# Patient Record
Sex: FEMALE | Race: WHITE | NOT HISPANIC OR LATINO | Employment: FULL TIME | URBAN - METROPOLITAN AREA
[De-identification: names, ages, dates, MRNs, and addresses within clinical notes are randomized per-mention and may not be internally consistent; named-entity substitution may affect disease eponyms.]

---

## 2017-07-19 ENCOUNTER — AMBULATORY - HEALTHEAST (OUTPATIENT)
Dept: ADDICTION MEDICINE | Facility: HOSPITAL | Age: 32
End: 2017-07-19

## 2017-07-19 ENCOUNTER — OFFICE VISIT - HEALTHEAST (OUTPATIENT)
Dept: ADDICTION MEDICINE | Facility: HOSPITAL | Age: 32
End: 2017-07-19

## 2017-07-19 DIAGNOSIS — F15.20 METHAMPHETAMINE USE DISORDER, SEVERE, DEPENDENCE (H): ICD-10-CM

## 2017-07-24 ENCOUNTER — OFFICE VISIT - HEALTHEAST (OUTPATIENT)
Dept: ADDICTION MEDICINE | Facility: HOSPITAL | Age: 32
End: 2017-07-24

## 2017-07-24 DIAGNOSIS — F15.20 METHAMPHETAMINE USE DISORDER, SEVERE, DEPENDENCE (H): ICD-10-CM

## 2017-07-25 ENCOUNTER — OFFICE VISIT - HEALTHEAST (OUTPATIENT)
Dept: ADDICTION MEDICINE | Facility: HOSPITAL | Age: 32
End: 2017-07-25

## 2017-07-25 DIAGNOSIS — F15.20 METHAMPHETAMINE USE DISORDER, SEVERE, DEPENDENCE (H): ICD-10-CM

## 2017-07-26 ENCOUNTER — OFFICE VISIT - HEALTHEAST (OUTPATIENT)
Dept: ADDICTION MEDICINE | Facility: HOSPITAL | Age: 32
End: 2017-07-26

## 2017-07-26 DIAGNOSIS — F15.20 METHAMPHETAMINE USE DISORDER, SEVERE, DEPENDENCE (H): ICD-10-CM

## 2017-07-27 ENCOUNTER — AMBULATORY - HEALTHEAST (OUTPATIENT)
Dept: ADDICTION MEDICINE | Facility: HOSPITAL | Age: 32
End: 2017-07-27

## 2017-07-28 ENCOUNTER — OFFICE VISIT - HEALTHEAST (OUTPATIENT)
Dept: ADDICTION MEDICINE | Facility: HOSPITAL | Age: 32
End: 2017-07-28

## 2017-07-28 DIAGNOSIS — F15.20 METHAMPHETAMINE USE DISORDER, SEVERE, DEPENDENCE (H): ICD-10-CM

## 2017-07-31 ENCOUNTER — OFFICE VISIT - HEALTHEAST (OUTPATIENT)
Dept: ADDICTION MEDICINE | Facility: HOSPITAL | Age: 32
End: 2017-07-31

## 2017-07-31 DIAGNOSIS — F15.20 METHAMPHETAMINE USE DISORDER, SEVERE, DEPENDENCE (H): ICD-10-CM

## 2017-08-01 ENCOUNTER — OFFICE VISIT - HEALTHEAST (OUTPATIENT)
Dept: ADDICTION MEDICINE | Facility: HOSPITAL | Age: 32
End: 2017-08-01

## 2017-08-01 DIAGNOSIS — F15.20 METHAMPHETAMINE USE DISORDER, SEVERE, DEPENDENCE (H): ICD-10-CM

## 2017-08-02 ENCOUNTER — OFFICE VISIT - HEALTHEAST (OUTPATIENT)
Dept: ADDICTION MEDICINE | Facility: HOSPITAL | Age: 32
End: 2017-08-02

## 2017-08-02 ENCOUNTER — AMBULATORY - HEALTHEAST (OUTPATIENT)
Dept: ADDICTION MEDICINE | Facility: HOSPITAL | Age: 32
End: 2017-08-02

## 2017-08-02 DIAGNOSIS — F15.20 METHAMPHETAMINE USE DISORDER, SEVERE, DEPENDENCE (H): ICD-10-CM

## 2017-08-04 ENCOUNTER — OFFICE VISIT - HEALTHEAST (OUTPATIENT)
Dept: ADDICTION MEDICINE | Facility: HOSPITAL | Age: 32
End: 2017-08-04

## 2017-08-04 DIAGNOSIS — F15.20 METHAMPHETAMINE USE DISORDER, SEVERE, DEPENDENCE (H): ICD-10-CM

## 2017-08-07 ENCOUNTER — OFFICE VISIT - HEALTHEAST (OUTPATIENT)
Dept: ADDICTION MEDICINE | Facility: HOSPITAL | Age: 32
End: 2017-08-07

## 2017-08-07 DIAGNOSIS — F15.20 METHAMPHETAMINE USE DISORDER, SEVERE, DEPENDENCE (H): ICD-10-CM

## 2017-08-08 ENCOUNTER — COMMUNICATION - HEALTHEAST (OUTPATIENT)
Dept: ADDICTION MEDICINE | Facility: HOSPITAL | Age: 32
End: 2017-08-08

## 2017-08-09 ENCOUNTER — OFFICE VISIT - HEALTHEAST (OUTPATIENT)
Dept: ADDICTION MEDICINE | Facility: HOSPITAL | Age: 32
End: 2017-08-09

## 2017-08-09 DIAGNOSIS — F15.20 METHAMPHETAMINE USE DISORDER, SEVERE, DEPENDENCE (H): ICD-10-CM

## 2017-08-10 ENCOUNTER — AMBULATORY - HEALTHEAST (OUTPATIENT)
Dept: ADDICTION MEDICINE | Facility: HOSPITAL | Age: 32
End: 2017-08-10

## 2017-08-11 ENCOUNTER — OFFICE VISIT - HEALTHEAST (OUTPATIENT)
Dept: ADDICTION MEDICINE | Facility: HOSPITAL | Age: 32
End: 2017-08-11

## 2017-08-11 DIAGNOSIS — F15.20 METHAMPHETAMINE USE DISORDER, SEVERE, DEPENDENCE (H): ICD-10-CM

## 2017-08-14 ENCOUNTER — OFFICE VISIT - HEALTHEAST (OUTPATIENT)
Dept: ADDICTION MEDICINE | Facility: HOSPITAL | Age: 32
End: 2017-08-14

## 2017-08-14 DIAGNOSIS — F15.20 METHAMPHETAMINE USE DISORDER, SEVERE, DEPENDENCE (H): ICD-10-CM

## 2017-08-15 ENCOUNTER — OFFICE VISIT - HEALTHEAST (OUTPATIENT)
Dept: ADDICTION MEDICINE | Facility: HOSPITAL | Age: 32
End: 2017-08-15

## 2017-08-15 DIAGNOSIS — F15.20 METHAMPHETAMINE USE DISORDER, SEVERE, DEPENDENCE (H): ICD-10-CM

## 2017-08-16 ENCOUNTER — OFFICE VISIT - HEALTHEAST (OUTPATIENT)
Dept: ADDICTION MEDICINE | Facility: HOSPITAL | Age: 32
End: 2017-08-16

## 2017-08-16 DIAGNOSIS — F15.20 METHAMPHETAMINE USE DISORDER, SEVERE, DEPENDENCE (H): ICD-10-CM

## 2017-08-17 ENCOUNTER — AMBULATORY - HEALTHEAST (OUTPATIENT)
Dept: ADDICTION MEDICINE | Facility: HOSPITAL | Age: 32
End: 2017-08-17

## 2017-08-21 ENCOUNTER — OFFICE VISIT - HEALTHEAST (OUTPATIENT)
Dept: ADDICTION MEDICINE | Facility: HOSPITAL | Age: 32
End: 2017-08-21

## 2017-08-21 DIAGNOSIS — F15.20 METHAMPHETAMINE USE DISORDER, SEVERE, DEPENDENCE (H): ICD-10-CM

## 2017-08-22 ENCOUNTER — OFFICE VISIT - HEALTHEAST (OUTPATIENT)
Dept: ADDICTION MEDICINE | Facility: HOSPITAL | Age: 32
End: 2017-08-22

## 2017-08-22 DIAGNOSIS — F15.20 METHAMPHETAMINE USE DISORDER, SEVERE, DEPENDENCE (H): ICD-10-CM

## 2017-08-23 ENCOUNTER — OFFICE VISIT - HEALTHEAST (OUTPATIENT)
Dept: ADDICTION MEDICINE | Facility: HOSPITAL | Age: 32
End: 2017-08-23

## 2017-08-23 DIAGNOSIS — F15.20 METHAMPHETAMINE USE DISORDER, SEVERE, DEPENDENCE (H): ICD-10-CM

## 2017-08-24 ENCOUNTER — AMBULATORY - HEALTHEAST (OUTPATIENT)
Dept: ADDICTION MEDICINE | Facility: HOSPITAL | Age: 32
End: 2017-08-24

## 2017-08-28 ENCOUNTER — OFFICE VISIT - HEALTHEAST (OUTPATIENT)
Dept: ADDICTION MEDICINE | Facility: HOSPITAL | Age: 32
End: 2017-08-28

## 2017-08-28 DIAGNOSIS — F15.20 METHAMPHETAMINE USE DISORDER, SEVERE, DEPENDENCE (H): ICD-10-CM

## 2017-08-29 ENCOUNTER — OFFICE VISIT - HEALTHEAST (OUTPATIENT)
Dept: ADDICTION MEDICINE | Facility: HOSPITAL | Age: 32
End: 2017-08-29

## 2017-08-29 ENCOUNTER — COMMUNICATION - HEALTHEAST (OUTPATIENT)
Dept: ADDICTION MEDICINE | Facility: HOSPITAL | Age: 32
End: 2017-08-29

## 2017-08-29 DIAGNOSIS — F15.20 METHAMPHETAMINE USE DISORDER, SEVERE, DEPENDENCE (H): ICD-10-CM

## 2017-08-30 ENCOUNTER — OFFICE VISIT - HEALTHEAST (OUTPATIENT)
Dept: ADDICTION MEDICINE | Facility: HOSPITAL | Age: 32
End: 2017-08-30

## 2017-08-30 DIAGNOSIS — F15.20 METHAMPHETAMINE USE DISORDER, SEVERE, DEPENDENCE (H): ICD-10-CM

## 2017-08-31 ENCOUNTER — AMBULATORY - HEALTHEAST (OUTPATIENT)
Dept: ADDICTION MEDICINE | Facility: HOSPITAL | Age: 32
End: 2017-08-31

## 2017-09-05 ENCOUNTER — COMMUNICATION - HEALTHEAST (OUTPATIENT)
Dept: ADDICTION MEDICINE | Facility: HOSPITAL | Age: 32
End: 2017-09-05

## 2017-09-06 ENCOUNTER — OFFICE VISIT - HEALTHEAST (OUTPATIENT)
Dept: ADDICTION MEDICINE | Facility: HOSPITAL | Age: 32
End: 2017-09-06

## 2017-09-06 DIAGNOSIS — F15.20 METHAMPHETAMINE USE DISORDER, SEVERE, DEPENDENCE (H): ICD-10-CM

## 2017-09-07 ENCOUNTER — AMBULATORY - HEALTHEAST (OUTPATIENT)
Dept: ADDICTION MEDICINE | Facility: HOSPITAL | Age: 32
End: 2017-09-07

## 2017-09-08 ENCOUNTER — OFFICE VISIT - HEALTHEAST (OUTPATIENT)
Dept: ADDICTION MEDICINE | Facility: HOSPITAL | Age: 32
End: 2017-09-08

## 2017-09-08 DIAGNOSIS — F15.20 METHAMPHETAMINE USE DISORDER, SEVERE, DEPENDENCE (H): ICD-10-CM

## 2017-09-11 ENCOUNTER — OFFICE VISIT - HEALTHEAST (OUTPATIENT)
Dept: ADDICTION MEDICINE | Facility: HOSPITAL | Age: 32
End: 2017-09-11

## 2017-09-11 DIAGNOSIS — F15.20 METHAMPHETAMINE USE DISORDER, SEVERE, DEPENDENCE (H): ICD-10-CM

## 2017-09-12 ENCOUNTER — COMMUNICATION - HEALTHEAST (OUTPATIENT)
Dept: ADDICTION MEDICINE | Facility: HOSPITAL | Age: 32
End: 2017-09-12

## 2017-09-13 ENCOUNTER — OFFICE VISIT - HEALTHEAST (OUTPATIENT)
Dept: ADDICTION MEDICINE | Facility: HOSPITAL | Age: 32
End: 2017-09-13

## 2017-09-13 DIAGNOSIS — F15.20 METHAMPHETAMINE USE DISORDER, SEVERE, DEPENDENCE (H): ICD-10-CM

## 2017-09-15 ENCOUNTER — OFFICE VISIT - HEALTHEAST (OUTPATIENT)
Dept: ADDICTION MEDICINE | Facility: HOSPITAL | Age: 32
End: 2017-09-15

## 2017-09-15 ENCOUNTER — AMBULATORY - HEALTHEAST (OUTPATIENT)
Dept: ADDICTION MEDICINE | Facility: HOSPITAL | Age: 32
End: 2017-09-15

## 2017-09-15 DIAGNOSIS — F15.20 METHAMPHETAMINE USE DISORDER, SEVERE, DEPENDENCE (H): ICD-10-CM

## 2017-09-19 ENCOUNTER — OFFICE VISIT - HEALTHEAST (OUTPATIENT)
Dept: ADDICTION MEDICINE | Facility: HOSPITAL | Age: 32
End: 2017-09-19

## 2017-09-19 DIAGNOSIS — F15.20 METHAMPHETAMINE USE DISORDER, SEVERE, DEPENDENCE (H): ICD-10-CM

## 2017-09-20 ENCOUNTER — OFFICE VISIT - HEALTHEAST (OUTPATIENT)
Dept: ADDICTION MEDICINE | Facility: HOSPITAL | Age: 32
End: 2017-09-20

## 2017-09-20 DIAGNOSIS — F15.20 METHAMPHETAMINE USE DISORDER, SEVERE, DEPENDENCE (H): ICD-10-CM

## 2017-09-21 ENCOUNTER — AMBULATORY - HEALTHEAST (OUTPATIENT)
Dept: ADDICTION MEDICINE | Facility: HOSPITAL | Age: 32
End: 2017-09-21

## 2017-09-26 ENCOUNTER — OFFICE VISIT - HEALTHEAST (OUTPATIENT)
Dept: ADDICTION MEDICINE | Facility: HOSPITAL | Age: 32
End: 2017-09-26

## 2017-09-26 DIAGNOSIS — F15.20 METHAMPHETAMINE USE DISORDER, SEVERE, DEPENDENCE (H): ICD-10-CM

## 2017-09-27 ENCOUNTER — OFFICE VISIT - HEALTHEAST (OUTPATIENT)
Dept: ADDICTION MEDICINE | Facility: HOSPITAL | Age: 32
End: 2017-09-27

## 2017-09-27 DIAGNOSIS — F15.20 METHAMPHETAMINE USE DISORDER, SEVERE, DEPENDENCE (H): ICD-10-CM

## 2017-09-28 ENCOUNTER — AMBULATORY - HEALTHEAST (OUTPATIENT)
Dept: ADDICTION MEDICINE | Facility: HOSPITAL | Age: 32
End: 2017-09-28

## 2017-10-03 ENCOUNTER — OFFICE VISIT - HEALTHEAST (OUTPATIENT)
Dept: ADDICTION MEDICINE | Facility: HOSPITAL | Age: 32
End: 2017-10-03

## 2017-10-03 DIAGNOSIS — F15.20 METHAMPHETAMINE USE DISORDER, SEVERE, DEPENDENCE (H): ICD-10-CM

## 2017-10-04 ENCOUNTER — OFFICE VISIT - HEALTHEAST (OUTPATIENT)
Dept: ADDICTION MEDICINE | Facility: HOSPITAL | Age: 32
End: 2017-10-04

## 2017-10-04 DIAGNOSIS — F15.20 METHAMPHETAMINE USE DISORDER, SEVERE, DEPENDENCE (H): ICD-10-CM

## 2017-10-05 ENCOUNTER — AMBULATORY - HEALTHEAST (OUTPATIENT)
Dept: ADDICTION MEDICINE | Facility: HOSPITAL | Age: 32
End: 2017-10-05

## 2017-10-10 ENCOUNTER — OFFICE VISIT - HEALTHEAST (OUTPATIENT)
Dept: ADDICTION MEDICINE | Facility: HOSPITAL | Age: 32
End: 2017-10-10

## 2017-10-10 DIAGNOSIS — F15.20 METHAMPHETAMINE USE DISORDER, SEVERE, DEPENDENCE (H): ICD-10-CM

## 2017-10-11 ENCOUNTER — OFFICE VISIT - HEALTHEAST (OUTPATIENT)
Dept: ADDICTION MEDICINE | Facility: HOSPITAL | Age: 32
End: 2017-10-11

## 2017-10-11 DIAGNOSIS — F15.20 METHAMPHETAMINE USE DISORDER, SEVERE, DEPENDENCE (H): ICD-10-CM

## 2017-10-19 ENCOUNTER — AMBULATORY - HEALTHEAST (OUTPATIENT)
Dept: ADDICTION MEDICINE | Facility: HOSPITAL | Age: 32
End: 2017-10-19
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
## 2021-06-11 NOTE — PROGRESS NOTES
Addiction Services - Initial Services Plan      Name:  Ananda Lindo  :  1985       MRN:  [id number]     Goal Methods   1.  Acceptance of chemical dependency and mental illness as a disease. A.  Comply with all med/psych recommendations  B.  Complete preliminary interviews  C.  Attend all program functions  D.  Attend all individual counseling sessions  E.  Read all assigned literature  F.  Complete psychological testing as recommended  G.  Particpate in any necessary consultations     2.  Acceptance of my need and ability to change A.  Complete any assignments.  B.  Participate in conferences (P.O., , family)  C.  Participate in 12 Step/support groups  D.  Actively participate in treatment planning  E.  Complete all assignments given or recommended on Treatment Plan     3.  Acceptance of staff recommendations as a means to my recovery A.  Participate in all interviews for Continuum of Care Plans  B.  Familiarize self with recovery program and how this applies to your day-to-day behavior       Patient describes their immediate need:  Pt reports none  Are there any immediate Safety Needs such as (physical, stability, mobility):  Pt reports none.    Immediate Health Needs and Plan:  Pt reports none.   Vulnerable Adult:  No    [] Continue Current Medications for:   [] Request Consult for:  [] Notify Attending Physician about:  [] Other:      Issues to be addressed in the first sessions:    Become acquainted with group norms and other group members.    Set up time to meet with primary counselor 1:1.     Patient strengths and needs:  Strengths: willingness to learn, gets along with people    Needs: additional sober relationships and structure, relapse prevention coping skills.     Plan for patient for time between intake and completion of the treatment plan:  Remain abstinent from any illicit substance/alcohol use and start group on Monday, 17  Participate in all treatment activities and  assignments.     Staff Members' Titles authorized to Initiate Services are:    Director of Behavioral Service    Clinical Director of Chemical Dependency    Primary Counselor    MI/MEREDITH Sr. Counselor        Nursing Staff    Vulnerable Adult Review  [x] Review of the facility Abuse Prevention plan was reviewed with the patient  [x] No individual abuse plan is necessary  [] In addition to the facility Abuse Prevention plan, an Individual Abuse Plan will be put in place    I understand these goals to be the Treatment Goals of the Program, and I agree to the stated Methods in attempting to accomplish these goals.    Patient Signature:  _________________________Date:  ___________________    Staff Name/Title:  FEDERICO Hylton, [facility]  Date:  7/19/2017  Time: 3:26 PM
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
## 2021-06-12 NOTE — PROGRESS NOTES
Ananda Lindo attended 3 hours of group therapy today.    Pt attended first IOP group.     7/24/2017 1:17 PM Raciel Lau
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
## 2021-06-12 NOTE — PROGRESS NOTES
Ananda Lindo attended 3 hours of group therapy today.    Pt reports continuing abstinence from all mood-altering substances.     7/31/2017 3:11 PM Raciel Lau

## 2021-06-12 NOTE — PROGRESS NOTES
Ananda Lindo attended 3 hours of group therapy today.    Pt reports continuing abstinence from all mood-altering substances.     9/11/2017 1:13 PM Raciel Lau

## 2021-06-12 NOTE — PROGRESS NOTES
Ananda Lindo attended 3 hours of group therapy today.    Pt briefly met w counselor individually to report she is feeling overwhelmed with her various obligations, including IOP x 4/wk, periodic UA's in a different part of [facility], parameters per her CP case and job hunting. Pt indicated she was open to following through on previous referral to individual counseling though feared adding another obligation to her schedule. Pt may be permitted to step-down to IOP x3/wk given her recent completion of Residential CD Tx and Aftercare. Pt agreed to schedule a MH Intake and f/u w counselor next week.     8/9/2017 2:01 PM Raciel Lau

## 2021-06-12 NOTE — PROGRESS NOTES
Ananda Lindo attended 3 hours of group therapy today.    Pt reports continuing abstinence from all mood-altering substances.     8/21/2017 1:34 PM Raciel Lau
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
## 2021-06-12 NOTE — PROGRESS NOTES
Ananda Lindo attended 3 hours of group therapy today.    Pt was provided information for local [facility] 180 meetings.     7/26/2017 1:43 PM Raciel Lau

## 2021-06-12 NOTE — PROGRESS NOTES
Ananda Lindo attended 3 hours of group therapy today.    Pt reports continuing abstinence from mood-altering substances.     8/7/2017 12:50 PM Raciel Lau

## 2021-06-12 NOTE — PROGRESS NOTES
Ananda Lindo attended 3 hours of group therapy today.    Pt reports continuing abstinence from all mood-altering substances.     9/6/2017 12:50 PM Raciel Lau

## 2021-06-12 NOTE — PROGRESS NOTES
Ananda Lindo attended 3 hours of group therapy today.    Pt reports she learned her Child Protection worker will recommend decreasing her current level-of-supervision at a scheduled court date on 8/31. Pt will move to monthly schedule UA tests and will be permitted unsupervised visits with her infant. Pt expressed considerable relief and gratitude.     8/30/2017 1:56 PM Raciel Lau

## 2021-06-12 NOTE — PROGRESS NOTES
Ananda Lindo attended 3 hours of group therapy today.    Pt briefly met w counselor individually. Pt reports she is frustrated with treatment as she has been in treatment for approximately 9 months. Pt states she wants to work and wishes she had more money coming in. Pt indicated frustration around wishing she could spend more time with her son. Pt also indicated she lives in a chaotic environment with lots of people coming and going, including several children present. Pt indicated this is a safe environment for her but that it leads to a sense of feeling overwhelmed.     Pt will step-down to IOP 2x/wk beginning next week, attending Tuesdays and Wednesdays. Pt has indv therapy appt scheduled for Mon 9/11 through [facility] in [place], [place].     9/8/2017 1:04 PM Raciel Lau
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
## 2021-06-13 NOTE — PROGRESS NOTES
"Ananda Lindo attended 3 hours of group therapy today.    Pt met briefly with counselor and agreed to step-down to IOP twice/wk, Tuesdays and Wednesdays, beginning 9/26. Pt will focus on overlapping the remainder of IOP with finding a \"home group,\" AA, NA, and/or [facility] with which to establish recovery relationships.     9/19/2017 2:23 PM Raciel Lau  "
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
## 2021-06-13 NOTE — PROGRESS NOTES
Ananda Lindo attended 3 hours of group therapy today.    Pt reports continuing abstinence from mood-altering substances. Pt reports she has been informed by her CPS worker that her Child Protection case will be closed on 10/26/17, to which Pt expressed great relief. Pt has been meeting with a therapist through [facility] and has requested a referral to a different therapist, preferably male, with a Pentecostal background.     10/3/2017 12:28 PM Raciel Lau
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
## 2021-06-13 NOTE — PROGRESS NOTES
Ananda Lindo attended 3 hours of group therapy today.    Pt completed IOP with staff approval on this date.     10/11/2017 1:40 PM Raciel Lau
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
## 2021-06-13 NOTE — PROGRESS NOTES
Ananda Lindo attended 3 hours of group therapy today.    Pt reports continuing abstinence from mood-altering substances. Pt reports attending NA and AA on 9/24 and 9/25 respectively.     9/26/2017 12:45 PM Raciel Lau
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
## 2022-09-19 NOTE — PROGRESS NOTES
Ananda Lindo attended 3 hours of group therapy today.    Pt reports continuing abstinence from methamphetamine. Pt attended  diagnostic assessment on 8/25 at [facility] in [place], [place].     8/28/2017 1:15 PM Raciel Lau   Home

## 2023-09-10 ENCOUNTER — WALK IN (OUTPATIENT)
Dept: URGENT CARE | Age: 38
End: 2023-09-10

## 2023-09-10 VITALS
TEMPERATURE: 97.7 F | WEIGHT: 104.28 LBS | HEART RATE: 110 BPM | SYSTOLIC BLOOD PRESSURE: 122 MMHG | OXYGEN SATURATION: 96 % | DIASTOLIC BLOOD PRESSURE: 64 MMHG

## 2023-09-10 DIAGNOSIS — J45.909 ACUTE ASTHMA: Primary | ICD-10-CM

## 2023-09-10 PROCEDURE — 99214 OFFICE O/P EST MOD 30 MIN: CPT | Performed by: STUDENT IN AN ORGANIZED HEALTH CARE EDUCATION/TRAINING PROGRAM

## 2023-09-10 RX ORDER — TRAZODONE HYDROCHLORIDE 50 MG/1
50 TABLET ORAL NIGHTLY PRN
COMMUNITY
Start: 2023-07-11

## 2023-09-10 RX ORDER — ALBUTEROL SULFATE 90 UG/1
2 AEROSOL, METERED RESPIRATORY (INHALATION) EVERY 4 HOURS PRN
Qty: 1 EACH | Refills: 0 | Status: SHIPPED | OUTPATIENT
Start: 2023-09-10

## 2023-09-10 RX ORDER — NALOXONE HYDROCHLORIDE 4 MG/.1ML
SPRAY NASAL
COMMUNITY
Start: 2023-07-11

## 2023-09-10 RX ORDER — BUPRENORPHINE HYDROCHLORIDE, NALOXONE HYDROCHLORIDE 4; 1 MG/1; MG/1
FILM, SOLUBLE BUCCAL; SUBLINGUAL
COMMUNITY
Start: 2023-07-14

## 2023-09-10 RX ORDER — PREDNISONE 20 MG/1
TABLET ORAL
Qty: 10 TABLET | Refills: 0 | Status: SHIPPED | OUTPATIENT
Start: 2023-09-10

## 2023-09-10 RX ORDER — AZITHROMYCIN 250 MG/1
TABLET, FILM COATED ORAL
Qty: 6 TABLET | Refills: 0 | Status: SHIPPED | OUTPATIENT
Start: 2023-09-10 | End: 2023-09-15

## 2023-11-08 DIAGNOSIS — J45.909 ACUTE ASTHMA: ICD-10-CM

## 2023-11-09 RX ORDER — ALBUTEROL SULFATE 90 UG/1
AEROSOL, METERED RESPIRATORY (INHALATION)
Qty: 8.5 G | OUTPATIENT
Start: 2023-11-09

## 2024-01-25 ENCOUNTER — WALK IN (OUTPATIENT)
Dept: URGENT CARE | Age: 39
End: 2024-01-25

## 2024-01-25 VITALS
HEIGHT: 65 IN | RESPIRATION RATE: 16 BRPM | WEIGHT: 110 LBS | TEMPERATURE: 101.9 F | BODY MASS INDEX: 18.33 KG/M2 | DIASTOLIC BLOOD PRESSURE: 65 MMHG | OXYGEN SATURATION: 98 % | SYSTOLIC BLOOD PRESSURE: 93 MMHG | HEART RATE: 127 BPM

## 2024-01-25 DIAGNOSIS — J02.9 SORE THROAT: ICD-10-CM

## 2024-01-25 DIAGNOSIS — Z20.822 SUSPECTED COVID-19 VIRUS INFECTION: ICD-10-CM

## 2024-01-25 DIAGNOSIS — J02.0 STREP THROAT: Primary | ICD-10-CM

## 2024-01-25 LAB
FLUAV AG UPPER RESP QL IA.RAPID: NEGATIVE
FLUBV AG UPPER RESP QL IA.RAPID: NEGATIVE
INTERNAL PROCEDURAL CONTROLS ACCEPTABLE: YES
S PYO AG THROAT QL IA.RAPID: POSITIVE
SARS-COV+SARS-COV-2 AG RESP QL IA.RAPID: NOT DETECTED
TEST LOT EXPIRATION DATE: ABNORMAL
TEST LOT NUMBER: ABNORMAL

## 2024-01-25 RX ORDER — LIDOCAINE HYDROCHLORIDE 20 MG/ML
15 SOLUTION OROPHARYNGEAL PRN
Qty: 200 ML | Refills: 0 | Status: SHIPPED | OUTPATIENT
Start: 2024-01-25

## 2024-01-25 RX ORDER — AMOXICILLIN 500 MG/1
500 CAPSULE ORAL 2 TIMES DAILY
Qty: 20 CAPSULE | Refills: 0 | Status: SHIPPED | OUTPATIENT
Start: 2024-01-25 | End: 2024-02-04

## 2024-09-23 NOTE — PROGRESS NOTES
Ananda Lindo attended 3 hours of group therapy today.    8/16/2017 12:33 PM Raciel Lau   Detail Level: Detailed